# Patient Record
Sex: MALE | Race: WHITE | NOT HISPANIC OR LATINO | Employment: FULL TIME | URBAN - METROPOLITAN AREA
[De-identification: names, ages, dates, MRNs, and addresses within clinical notes are randomized per-mention and may not be internally consistent; named-entity substitution may affect disease eponyms.]

---

## 2024-11-04 ENCOUNTER — OFFICE VISIT (OUTPATIENT)
Dept: URGENT CARE | Age: 49
End: 2024-11-04
Payer: COMMERCIAL

## 2024-11-04 ENCOUNTER — ANCILLARY PROCEDURE (OUTPATIENT)
Dept: URGENT CARE | Age: 49
End: 2024-11-04
Payer: COMMERCIAL

## 2024-11-04 VITALS
HEIGHT: 71 IN | SYSTOLIC BLOOD PRESSURE: 111 MMHG | OXYGEN SATURATION: 97 % | TEMPERATURE: 98.2 F | RESPIRATION RATE: 20 BRPM | DIASTOLIC BLOOD PRESSURE: 68 MMHG | HEART RATE: 87 BPM | BODY MASS INDEX: 31.78 KG/M2 | WEIGHT: 227 LBS

## 2024-11-04 DIAGNOSIS — S49.91XA INJURY OF RIGHT SHOULDER, INITIAL ENCOUNTER: ICD-10-CM

## 2024-11-04 DIAGNOSIS — S06.0X0A CONCUSSION WITHOUT LOSS OF CONSCIOUSNESS, INITIAL ENCOUNTER: Primary | ICD-10-CM

## 2024-11-04 RX ORDER — OXYCODONE AND ACETAMINOPHEN 5; 325 MG/1; MG/1
1 TABLET ORAL EVERY 8 HOURS PRN
COMMUNITY
Start: 2019-11-22 | End: 2024-11-13

## 2024-11-04 RX ORDER — SECUKINUMAB 150 MG/ML
INJECTION SUBCUTANEOUS
COMMUNITY
Start: 2024-02-13 | End: 2025-02-12

## 2024-11-04 RX ORDER — IXEKIZUMAB 80 MG/ML
INJECTION, SOLUTION SUBCUTANEOUS
COMMUNITY
Start: 2019-08-12

## 2024-11-04 RX ORDER — METHOCARBAMOL 500 MG/1
500 TABLET, FILM COATED ORAL 4 TIMES DAILY PRN
Qty: 40 TABLET | Refills: 0 | Status: SHIPPED | OUTPATIENT
Start: 2024-11-04 | End: 2025-01-03

## 2024-11-04 RX ORDER — VENLAFAXINE 75 MG/1
75 TABLET ORAL
COMMUNITY
Start: 2019-09-16 | End: 2025-02-18

## 2024-11-04 RX ORDER — BENZONATATE 100 MG/1
100 CAPSULE ORAL EVERY 8 HOURS PRN
COMMUNITY
Start: 2024-10-15

## 2024-11-04 RX ORDER — LEVOTHYROXINE SODIUM 75 UG/1
75 TABLET ORAL
COMMUNITY
Start: 2024-08-07

## 2024-11-04 ASSESSMENT — PAIN SCALES - GENERAL: PAINLEVEL_OUTOF10: 8

## 2024-11-04 NOTE — PROGRESS NOTES
"Subjective   Patient ID: Saran De Santiago is a 49 y.o. male. They present today with a chief complaint of Injury ([facility] initial right shoulder, head injury after garage door fell onto head then right shoulder today appx 1:45pm.).    History of Present Illness  50 yo male coming in for right sided headache and right shoulder  pain. He states a garage door fell onto his head and shoulder. He denies any loss of consciousness. He states when he moves his right arm a certain way he does get some tingling in his fingers.    Past Medical History  Allergies as of 11/04/2024    (No Known Allergies)       (Not in a hospital admission)       History reviewed. No pertinent past medical history.    No past surgical history on file.         Review of Systems  Review of Systems:  General: No weight loss, fatigue, anorexia, insomnia, fever, chills.  Eyes: No vision loss, double vision, blurred vision, drainage, or eye pain.  Cardiac: No chest pain, palpitations, syncope, near syncope.  Pulmonary:  No shortness of breath, cough, hemoptysis  Heme/lymph: No swollen glands, fever, bleeding  GI: No abdominal pain, change in bowel habits, melena, hematemesis, hematochezia, nausea, vomiting, or diarrhea  Musculoskeletal: No limb pain, joint pain, joint swelling. Positive right shoulder pain  Skin: No rashes  Neuro: No numbness, tingling, positive right sided headaches                                 Objective    Vitals:    11/04/24 1528   BP: 111/68   Pulse: 87   Resp: 20   Temp: 36.8 °C (98.2 °F)   TempSrc: Oral   SpO2: 97%   Weight: 103 kg (227 lb)   Height: 1.803 m (5' 11\")     No LMP for male patient.    Physical Exam  Head Injury Exam:  General: Vitals noted, no distress. Afebrile  Head: Normocephalic, atraumatic. Pupils PERRL, EOMI. TMs clear, no hemotympanum  Neck: Supple, no midline or paraspinal tenderness through full range of motion  Cardiac: Regular rate and rhythm, no murmur  Lungs: Clear to auscultation bilaterally with good " aeration and no adventitious breath sounds  Musculoskeletal: Exam of the right  shoulder shows no deformity, edema, or erythema. There is pain on palpation to the glenohumeral joint and to the distal clavicle. There is decreased active range of motion. Passive range of motion is slightly improved over active range of motion. Patient has strong equal hand grasps bilaterally, is neurovascularly intact. Skin is intact.  Neuro: No focal neurologic deficits. GCS is 15.      Point of Care Test & Imaging Results from this visit  No results found for this visit on 11/04/24.   XR shoulder right 2+ views    Result Date: 11/4/2024  Interpreted By:  Omari Odonnell, STUDY: XR SHOULDER RIGHT 2+ VIEWS; 11/4/2024 3:54 pm   INDICATION: Signs/Symptoms:garage door fell on shoulder. Pain.   COMPARISON: None available.   ACCESSION NUMBER(S): [id number]   ORDERING CLINICIAN: DION BOWSER   TECHNIQUE: Views:   FINDINGS: RESULT: There is no evidence for fracture or dislocation.  No bone lesion is identified. The glenohumeral joint appears unremarkable. Mild degenerative changes of the AC joint. No soft tissue abnormality is identified.       No evidence for acute fracture or dislocation.   Degenerative changes of the AC joint.   Signed by: Omari Odonnell 11/4/2024 4:04 PM Dictation workstation:   [id number]     Diagnostic study results (if any) were reviewed by VINCENT Sotomayor.    Assessment/Plan   Allergies, medications, history, and pertinent labs/EKGs/Imaging reviewed by VINCENT Sotomayor.     Medical Decision Making  Testing: xr right shoulder  Treatment: Methocarbamol prescribed  Differential: 1) shoulder dislocation, 2) concussion , 3) shoulder fracture  Plan: Discussed differential with the pateint. Patient will follow up with the PCP in the next 2-3 days. Return for any worsening symptoms or go to the ER for further evaluation. Patient understands return precautions and discharege  insturctions.  Impression:   1) shoulder strain  2) concussion      Orders and Diagnoses  Diagnoses and all orders for this visit:  Concussion without loss of consciousness, initial encounter  Injury of right shoulder, initial encounter  -     XR shoulder right 2+ views; Future      Medical Admin Record    See scanned [facility] paperwork    Patient disposition: Home    Electronically signed by VINCENT Sotomayor  4:28 PM